# Patient Record
Sex: FEMALE | URBAN - METROPOLITAN AREA
[De-identification: names, ages, dates, MRNs, and addresses within clinical notes are randomized per-mention and may not be internally consistent; named-entity substitution may affect disease eponyms.]

---

## 2019-08-28 ENCOUNTER — OFFICE VISIT (OUTPATIENT)
Dept: URBAN - METROPOLITAN AREA CLINIC 23 | Facility: CLINIC | Age: 84
End: 2019-08-28
Payer: MEDICARE

## 2019-08-28 DIAGNOSIS — H26.491 OTHER SECONDARY CATARACT, RIGHT EYE: Primary | ICD-10-CM

## 2019-08-28 PROCEDURE — 99204 OFFICE O/P NEW MOD 45 MIN: CPT | Performed by: OPHTHALMOLOGY

## 2019-08-28 ASSESSMENT — VISUAL ACUITY
OD: 20/30
OS: 20/30

## 2019-08-28 ASSESSMENT — INTRAOCULAR PRESSURE
OS: 14
OD: 14

## 2019-08-28 NOTE — IMPRESSION/PLAN
Impression: Other secondary cataract, right eye: H26.491. Condition: quality of life issue. Symptoms: could improve with surgery. Vision: vision affected. Plan: Discussed diagnosis. Discussed treatment options. Recommend YAG PC OD. R/B/A discussed and understood by patient and patient elects to proceed with YAG OD as recommended.

## 2019-09-27 ENCOUNTER — SURGERY (OUTPATIENT)
Dept: URBAN - METROPOLITAN AREA SURGERY 11 | Facility: SURGERY | Age: 84
End: 2019-09-27
Payer: MEDICARE

## 2019-09-27 PROCEDURE — 66821 AFTER CATARACT LASER SURGERY: CPT | Performed by: OPHTHALMOLOGY

## 2020-09-09 ENCOUNTER — OFFICE VISIT (OUTPATIENT)
Dept: URBAN - METROPOLITAN AREA CLINIC 23 | Facility: CLINIC | Age: 85
End: 2020-09-09
Payer: COMMERCIAL

## 2020-09-09 DIAGNOSIS — H53.19 OTHER SUBJECTIVE VISUAL DISTURBANCES: Primary | ICD-10-CM

## 2020-09-09 DIAGNOSIS — H52.223 REGULAR ASTIGMATISM, BILATERAL: ICD-10-CM

## 2020-09-09 DIAGNOSIS — H52.202 UNSPECIFIED ASTIGMATISM, LEFT EYE: ICD-10-CM

## 2020-09-09 PROCEDURE — 92014 COMPRE OPH EXAM EST PT 1/>: CPT | Performed by: OPHTHALMOLOGY

## 2020-09-09 ASSESSMENT — INTRAOCULAR PRESSURE
OS: 12
OD: 13

## 2020-09-09 ASSESSMENT — VISUAL ACUITY
OD: 20/20
OS: 20/25

## 2020-09-09 NOTE — IMPRESSION/PLAN
Impression: Other subjective visual disturbances: H53.19. Condition: will continue to monitor. Plan: Discussed diagnosis in detail with patient. No treatment is required at this time. No progression expected. New glasses Rx was given today. Patient will update glassess RX first. Will continue to observe condition and or symptoms. Call if [address] worsens.

## 2020-09-09 NOTE — IMPRESSION/PLAN
Impression: Unspecified astigmatism, left eye: H52.202. Plan: Discussed diagnosis in detail with patient. Discussed treatment options with patient. New glasses Rx was given today.  Patient will update glassess RX first.

## 2021-05-19 ENCOUNTER — OFFICE VISIT (OUTPATIENT)
Dept: URBAN - METROPOLITAN AREA CLINIC 23 | Facility: CLINIC | Age: 86
End: 2021-05-19
Payer: MEDICARE

## 2021-05-19 PROCEDURE — 99213 OFFICE O/P EST LOW 20 MIN: CPT | Performed by: OPHTHALMOLOGY

## 2021-05-19 PROCEDURE — 92134 CPTRZ OPH DX IMG PST SGM RTA: CPT | Performed by: OPHTHALMOLOGY

## 2021-05-19 ASSESSMENT — KERATOMETRY
OS: 44.63
OD: 43.88

## 2021-05-19 ASSESSMENT — INTRAOCULAR PRESSURE
OD: 12
OS: 12

## 2021-05-19 NOTE — IMPRESSION/PLAN
Impression: Other subjective visual disturbances: H53.19. Condition: will continue to monitor. Plan: OCT OU ordered and performed today and results discussed with patient. Discussed diagnosis in detail with patient. Discussed treatment options with patient. Recommend updated glasses prescription at patient convenience.